# Patient Record
Sex: MALE | Race: BLACK OR AFRICAN AMERICAN | NOT HISPANIC OR LATINO
[De-identification: names, ages, dates, MRNs, and addresses within clinical notes are randomized per-mention and may not be internally consistent; named-entity substitution may affect disease eponyms.]

---
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
## 2025-02-05 NOTE — PATIENT PROFILE PEDIATRIC. - PRESSURE ULCER(S)
Physical Therapy Visit    Visit Type: Daily Treatment Note  Visit: 6  Referring Provider: Marzena Perry MD  Medical Diagnosis (from order): M54.2 - Neck pain on right side     SUBJECTIVE                                                                                                               The past three nights in a row he has been able to sleep pretty well, pain is more in the shoulder today.      OBJECTIVE                                                                                                                         Palpation  Right  - Levator Scapulae: hypertonic and tenderness  - Upper Trapezius: hypertonic  - Deltoid: hypertonic and tenderness                   Treatment     Therapeutic Exercise  arm bike forward and reverse x2 minutes each   Wall D x10    Held below:  Right shoulder posterior capsule stretch x30 seconds   Trunk rotation at wall x5 each - held today    Manual Therapy   Seated soft tissue release to right upper trapezius and levator muscles  Sidelying scapular mobilizations with mavis-scapular soft tissue massage    Neuromuscular Re-Education  Posture:  Bilateral shoulder flexion wall slides x10 with lift off  Sidelying scapular depression and retraction x10  Quadruped chin tucks x10, alternating shoulder flexion x10, cat cow x10   Shoulder abduction wall taps with red weighted ball x10  Standing single arm scapular depression and retraction with green band x10 walkout    Muscle Facilitation/Inhibition:  Serratus ball rolls 10x4 directions with red weighted ball  Wall clock with red band x10 each   Serratus wall slides with foam roll x10    Held below:  Incline plank at table: serratus push ups, alternating shoulder taps x10 each   Speed pulleys: rows, extensions, horizontal abduction and adduction, external rotation and internal rotation (3-4 plates) x10 each   Scapular depression with ball at table x10 each   Bilateral external rotation with scapular retraction x10 green  band    Skilled input: verbal instruction/cues, tactile instruction/cues, posture correction and as detailed above    Writer verbally educated and received verbal consent for hand placement, positioning of patient, and techniques to be performed today from patient for hand placement and palpation for techniques, therapist position for techniques and clothing adjustments for techniques as described above and how they are pertinent to the patient's plan of care.  Home Exercise Program  Access Code: [email or web address]  URL: [email or web address]  Date: 01/30/2025  Prepared by: Norma Stephens    Exercises  - Seated Scapular Retraction  - 1 x daily - 7 x weekly - 10 reps  - Seated Upper Trapezius Stretch  - 1 x daily - 7 x weekly - 2 reps - 30 hold  - Gentle Levator Scapulae Stretch  - 1 x daily - 7 x weekly - 2 reps - 30 hold  - Shoulder External Rotation and Scapular Retraction  - 1 x daily - 7 x weekly - 10 reps  - Seated Cat Cow  - 1 x daily - 7 x weekly - 10 reps  - Plank with Thoracic Rotation on Counter  - 1 x daily - 7 x weekly - 10 reps  - Plank on Table with Scapular Protraction Retraction  - 1 x daily - 7 x weekly - 10 reps  - Shoulder Taps on Table  - 1 x daily - 7 x weekly - 10 reps      ASSESSMENT                                                                                                            Patient continues to progress with posture and shoulder strength and stability. He requires minimal to no cueing for cervical posture during exercises today. He has some difficulty with scapular positioning but after tactile cueing he is able to complete exercises with good mechanics. We were able to transition to the weighted ball today for ball rolls at the wall. He reports some fatigue with shoulder strengthening today but no increase in pain.  Education:   - Results of above outlined education: Verbalizes understanding and Demonstrates understanding    PLAN                                                                                                                            Suggestions for next session as indicated: Progress per plan of care       Therapy procedure time and total treatment time can be found documented on the Time Entry flowsheet     no

## 2025-08-16 ENCOUNTER — EMERGENCY (EMERGENCY)
Facility: HOSPITAL | Age: 20
LOS: 0 days | Discharge: HOME | End: 2025-08-16
Attending: STUDENT IN AN ORGANIZED HEALTH CARE EDUCATION/TRAINING PROGRAM
Payer: MEDICAID

## 2025-08-16 VITALS
HEIGHT: 75 IN | TEMPERATURE: 98 F | RESPIRATION RATE: 18 BRPM | HEART RATE: 103 BPM | WEIGHT: 179.9 LBS | DIASTOLIC BLOOD PRESSURE: 73 MMHG | SYSTOLIC BLOOD PRESSURE: 120 MMHG | OXYGEN SATURATION: 96 %

## 2025-08-16 VITALS
HEART RATE: 69 BPM | DIASTOLIC BLOOD PRESSURE: 72 MMHG | RESPIRATION RATE: 13 BRPM | OXYGEN SATURATION: 98 % | TEMPERATURE: 99 F | SYSTOLIC BLOOD PRESSURE: 119 MMHG

## 2025-08-16 DIAGNOSIS — S09.90XA UNSPECIFIED INJURY OF HEAD, INITIAL ENCOUNTER: ICD-10-CM

## 2025-08-16 DIAGNOSIS — S00.531A CONTUSION OF LIP, INITIAL ENCOUNTER: ICD-10-CM

## 2025-08-16 DIAGNOSIS — Z23 ENCOUNTER FOR IMMUNIZATION: ICD-10-CM
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
## 2025-08-16 DIAGNOSIS — S01.112A LACERATION WITHOUT FOREIGN BODY OF LEFT EYELID AND PERIOCULAR AREA, INITIAL ENCOUNTER: ICD-10-CM

## 2025-08-16 DIAGNOSIS — Z78.9 OTHER SPECIFIED HEALTH STATUS: Chronic | ICD-10-CM

## 2025-08-16 PROCEDURE — 70450 CT HEAD/BRAIN W/O DYE: CPT | Mod: 26

## 2025-08-16 PROCEDURE — 70486 CT MAXILLOFACIAL W/O DYE: CPT | Mod: 26

## 2025-08-16 PROCEDURE — 12011 RPR F/E/E/N/L/M 2.5 CM/<: CPT

## 2025-08-16 PROCEDURE — 99284 EMERGENCY DEPT VISIT MOD MDM: CPT | Mod: 25

## 2025-08-16 RX ORDER — ACETAMINOPHEN 500 MG/5ML
975 LIQUID (ML) ORAL ONCE
Refills: 0 | Status: COMPLETED | OUTPATIENT
Start: 2025-08-16 | End: 2025-08-16

## 2025-08-16 RX ADMIN — Medication 975 MILLIGRAM(S): at 03:37

## 2025-08-16 RX ADMIN — Medication 975 MILLIGRAM(S): at 02:37
